# Patient Record
Sex: MALE | Race: WHITE | NOT HISPANIC OR LATINO | Employment: FULL TIME | URBAN - METROPOLITAN AREA
[De-identification: names, ages, dates, MRNs, and addresses within clinical notes are randomized per-mention and may not be internally consistent; named-entity substitution may affect disease eponyms.]

---

## 2018-03-03 ENCOUNTER — OFFICE VISIT - RIVER FALLS (OUTPATIENT)
Dept: FAMILY MEDICINE | Facility: CLINIC | Age: 58
End: 2018-03-03

## 2020-11-11 ENCOUNTER — OFFICE VISIT - RIVER FALLS (OUTPATIENT)
Dept: FAMILY MEDICINE | Facility: CLINIC | Age: 60
End: 2020-11-11

## 2020-11-11 ASSESSMENT — MIFFLIN-ST. JEOR: SCORE: 1879.94

## 2022-02-11 VITALS
TEMPERATURE: 98 F | HEART RATE: 64 BPM | OXYGEN SATURATION: 98 % | WEIGHT: 228.2 LBS | DIASTOLIC BLOOD PRESSURE: 82 MMHG | SYSTOLIC BLOOD PRESSURE: 144 MMHG

## 2022-02-11 VITALS
WEIGHT: 226.4 LBS | HEART RATE: 62 BPM | HEIGHT: 72 IN | OXYGEN SATURATION: 96 % | SYSTOLIC BLOOD PRESSURE: 134 MMHG | TEMPERATURE: 97.8 F | DIASTOLIC BLOOD PRESSURE: 74 MMHG | BODY MASS INDEX: 30.66 KG/M2

## 2022-02-16 NOTE — PROGRESS NOTES
Chief Complaint    c/o right great toenail inflamed.  has been soaking toe at night.  denies drainage from toe.  History of Present Illness       Patient is here with erythema and slight pain of his right great toe.  He is concerned about possible ingrown nail.  He does not recall an injury.  Last night the toe was fairly painful.  He has noticed discoloration of the toenail  Review of Systems       See HPI.  All other review of systems negative.  Physical Exam   Vitals & Measurements    T: 97.8  F (Tympanic)  HR: 62 (Peripheral)  BP: 134/74  SpO2: 96%     HT: 72 in  WT: 226.4 lb  BMI: 30.7        Alert, oriented, no acute distress       Normal heart rate       Nonlabored breathing       Exam of the right great toe reveals erythema of the paronychia him, fluctuance at the base of the nail, subungual fluid collection present  Assessment/Plan       Paronychia of great toe, right (L03.031)          Paronychia of the great toe with subungual fluid collection         The toenail was cleansed and an 18-gauge needle was used to drill a hole in the toenail.  There is return of thin purulent material.         I suspect he developed a small abscess under the toenail.  If symptoms do not improve over the next 48 hours he will start cephalexin 1000 mg twice daily for a week.  He will most likely lose his nail over the next couple of months.         Ordered:          cephalexin, = 2 tab(s) ( 1,000 mg ), PO, bid, x 5 day(s), # 20 tab(s), 0 Refill(s), Type: Acute, (Ordered)           Patient Information     Name:JOSE LEBRON      Address:      [address] [place] [id number]     Sex:Male     YOB: 1960     Phone:[phone]     Emergency Contact:[facility] EMERGENCY, CONTACT     MRN:[id number]     FIN:[id number]     Location:[facility]     Date of Service:11/11/2020      Primary Care Physician:       Fabian Casanova MD, [phone]      Attending Physician:       Juan Carbajal MD  [phone]  Problem List/Past Medical History    Ongoing     History of Sinusitis     Impingement Syndrome of Shoulder       Comments: Due to overuse.     Obesity    Historical     No qualifying data  Procedure/Surgical History     finger laceration (11/25/2009)      Comments: 5-0 nylon sutures placed..  Medications    cephalexin 500 mg oral tablet, 1000 mg= 2 tab(s), Oral, bid  Allergies    penicillins  Social History    Smoking Status     Never smoker     Alcohol - Current      Current, 1-2 times per month, 1 drinks/episode average.     Exercise - Regular exercise      Exercise frequency: 5-6 times/week.     Substance Abuse - Denies Substance Abuse     Tobacco - Denies Tobacco Use  Immunizations      Vaccine Date Status          influenza 10/11/2010 Given          Td 11/25/2009 Recorded

## 2022-02-16 NOTE — PROGRESS NOTES
Chief Complaint    Coughing up phlegm, sore throat, not feeling well. Ongoing x3-4 days.  History of Present Illness      Chief complaint as above reviewed and confirmed with patient.  Pt presents to the clinic with concerns re: congestion and cough.  sx present 3 days.  Has had thin nasal discharge but feels moderate PND and coughing up some thick sputum.  has some pressure but not pain in the face. no headache. no fevers.  no nausea, vomiting.  no fevers or chills.  activity and appetite are unchanged.  no hx sinus surgery.  no exposures.  Does have a hx of sinusitis .  coughing but no sob or difficulty breathing.  no chest pain.  no fevers.   Review of Systems      Review of systems is negative with the exception of those noted in HPI          Physical Exam   Vitals & Measurements    T: 98.0(Tympanic)  HR: 64(Peripheral)  BP: 144/82  SpO2: 98%     WT: 228.2 lb           Vitals as above per nursing documentation           Constitutional : nad appears well          Ears: ears patent B, TMS intact, noninjected           Nose: nasal mucosa is non-edematous. no discharge           Throat: pharynx is nonerythematous, no tonsillar hypertrophy, no exudate           Neck: neck supple, no adenopathy, no thyromegaly, no rigidity           Lungs: lungs CTA', no Wheezes, rhonchi or rales           Heart: heart RRR, nl S1, S2 no murmur           skin:  No rashes              Assessment/Plan       1. Acute URI         recommended conservative meausres, fluids ,rest and ibuprofen or tylenol for comfort. Tessalon for cough. seudafed for congetion.  PI given for sinusitis to hopefully use conservqative measures to avoid but if persisting greater th an 10-14 days or worsening will RTC.       Orders:         benzonatate, 2 cap(s) ( 200 mg ), PO, TID, # 60 cap(s), 0 Refill(s), Type: Maintenance, 2 cap(s) po tid,x10 day(s)         pseudoephedrine, 1 tab(s) ( 60 mg ), PO, q6hr, PRN: for cold symptoms, # 40 tab(s), 0 Refill(s), Type:  Maintenance, 1 tab(s) po q6 hrs,PRN:for cold symptoms  Patient Information     Name:JOSE LEBRON      Address:      [address] [place], [place] [id number]     Sex:Male     YOB: 1960     Phone:[phone]     MRN:[id number]     FIN:[id number]     Location:[facility]     Date of Service:03/03/2018      Primary Care Physician:       Fabian Casanova MD, [phone]  Problem List/Past Medical History    Ongoing     History of Sinusitis     Impingement Syndrome of Shoulder      Comments: Due to overuse.     Obesity    Historical  Procedure/Surgical History     finger laceration (11/25/2009)  Medications     Tessalon Perles 100 mg oral capsule: 200 mg, 2 cap(s), PO, TID, for 10 day(s), 60 cap(s), 0 Refill(s).     pseudoephedrine 60 mg oral tablet: 60 mg, 1 tab(s), PO, q6hr, PRN: for cold symptoms, 40 tab(s), 0 Refill(s).      Allergies    penicillins  Social History    Smoking Status - 03/03/2018     Never smoker     Alcohol - Current, 10/11/2010      Current, 1-2 times per month, 1 drinks/episode average.     Exercise and Physical Activity - Regular exercise, 10/11/2010      Exercise frequency: 5-6 times/week.     Substance Abuse - Denies Substance Abuse, 10/11/2010     Tobacco - Denies Tobacco Use, 10/11/2010  Immunizations      Vaccine Date Status      influenza 10/11/2010 Given      Td 11/25/2009 Recorded  Lab Results   Results (Last 90 days)   No results located.

## 2022-02-16 NOTE — NURSING NOTE
Comprehensive Intake Entered On:  11/11/2020 11:31 AM CST    Performed On:  11/11/2020 11:25 AM CST by Trena CEDEÑO, Loulou               Summary   Chief Complaint :   c/o right great toenail inflamed.  has been soaking toe at night.  denies drainage from toe.   Weight Measured :   226.4 lb(Converted to: 226 lb 6 oz, 102.693 kg)    Height Measured :   72 in(Converted to: 6 ft 0 in, 182.88 cm)    Body Mass Index :   30.7 kg/m2 (HI)    Body Surface Area :   2.28 m2   Systolic Blood Pressure :   134 mmHg (HI)    Diastolic Blood Pressure :   74 mmHg   Mean Arterial Pressure :   94 mmHg   Peripheral Pulse Rate :   62 bpm   BP Site :   Right arm   Pulse Site :   Radial artery   BP Method :   Manual   HR Method :   Manual   Temperature Tympanic :   97.8 DegF(Converted to: 36.6 DegC)  (LOW)    Oxygen Saturation :   96 %   Loulou Albrecht MA - 11/11/2020 11:25 AM CST   Health Status   Allergies Verified? :   Yes   Medication History Verified? :   Yes   Medical History Verified? :   Yes   Pre-Visit Planning Status :   Not completed   Tobacco Use? :   Never smoker   Loulou Albrecht MA - 11/11/2020 11:25 AM CST   Consents   Consent for Immunization Exchange :   Consent Granted   Consent for Immunizations to Providers :   Consent Granted   Loulou Albrecht MA - 11/11/2020 11:25 AM CST   Meds / Allergies   (As Of: 11/11/2020 11:31:05 AM CST)   Allergies (Active)   penicillins  Estimated Onset Date:   Unspecified ; Created By:   Nievse Little; Reaction Status:   Active ; Category:   Drug ; Substance:   penicillins ; Type:   Allergy ; Updated By:   Nieves Little; Reviewed Date:   3/30/2016 1:49 PM CDT        Medication List   (As Of: 11/11/2020 11:31:05 AM CST)   Prescription/Discharge Order    benzonatate  :   benzonatate ; Status:   Completed ; Ordered As Mnemonic:   Tessalon Perles 100 mg oral capsule ; Simple Display Line:   200 mg, 2 cap(s), PO, TID, for 10 day(s), 60 cap(s), 0 Refill(s) ; Ordering Provider:   Iván  Thu BAUGH; Catalog Code:   benzonatate ; Order Dt/Tm:   3/3/2018 3:29:38 PM CST          pseudoephedrine  :   pseudoephedrine ; Status:   Completed ; Ordered As Mnemonic:   pseudoephedrine 60 mg oral tablet ; Simple Display Line:   60 mg, 1 tab(s), PO, q6hr, PRN: for cold symptoms, 40 tab(s), 0 Refill(s) ; Ordering Provider:   Thu Minor PA-C; Catalog Code:   pseudoephedrine ; Order Dt/Tm:   3/3/2018 3:33:50 PM CST            ID Risk Screen   Recent Travel History :   No recent travel   Family Member Travel History :   No recent travel   Other Exposure to Infectious Disease :   Unknown   Loulou Albrecht MA - 11/11/2020 11:25 AM CST   Social History   Social History   (As Of: 11/11/2020 11:31:05 AM CST)   Alcohol:  Current      Current, 1-2 times per month, 1 drinks/episode average.   (Last Updated: 10/11/2010 11:27:33 AM CDT by Nieves Little CMA)          Tobacco:  Denies Tobacco Use      (Last Updated: 10/11/2010 7:49:08 AM CDT by Loulou Manley )         Substance Abuse:  Denies Substance Abuse      (Last Updated: 10/11/2010 11:27:36 AM CDT by Nieves Little CMA )         Exercise:  Regular exercise      Exercise frequency: 5-6 times/week.   (Last Updated: 10/11/2010 11:27:56 AM CDT by Nieves Little CMA)

## 2023-02-15 ENCOUNTER — ANCILLARY PROCEDURE (OUTPATIENT)
Dept: GENERAL RADIOLOGY | Facility: CLINIC | Age: 63
End: 2023-02-15
Attending: PHYSICIAN ASSISTANT
Payer: COMMERCIAL

## 2023-02-15 ENCOUNTER — OFFICE VISIT (OUTPATIENT)
Dept: FAMILY MEDICINE | Facility: CLINIC | Age: 63
End: 2023-02-15
Payer: COMMERCIAL

## 2023-02-15 VITALS
BODY MASS INDEX: 29.16 KG/M2 | DIASTOLIC BLOOD PRESSURE: 78 MMHG | RESPIRATION RATE: 18 BRPM | SYSTOLIC BLOOD PRESSURE: 112 MMHG | OXYGEN SATURATION: 96 % | WEIGHT: 220 LBS | TEMPERATURE: 97.5 F | HEART RATE: 74 BPM | HEIGHT: 73 IN

## 2023-02-15 DIAGNOSIS — M25.561 ACUTE PAIN OF RIGHT KNEE: Primary | ICD-10-CM

## 2023-02-15 DIAGNOSIS — M79.601 PAIN OF RIGHT UPPER EXTREMITY: ICD-10-CM

## 2023-02-15 DIAGNOSIS — M25.561 ACUTE PAIN OF RIGHT KNEE: ICD-10-CM

## 2023-02-15 PROBLEM — E66.9 OBESITY: Status: ACTIVE | Noted: 2023-02-15

## 2023-02-15 PROCEDURE — 73130 X-RAY EXAM OF HAND: CPT | Mod: TC | Performed by: RADIOLOGY

## 2023-02-15 PROCEDURE — 99213 OFFICE O/P EST LOW 20 MIN: CPT | Performed by: PHYSICIAN ASSISTANT

## 2023-02-15 PROCEDURE — 73562 X-RAY EXAM OF KNEE 3: CPT | Mod: TC | Performed by: RADIOLOGY

## 2023-02-15 RX ORDER — PREDNISONE 20 MG/1
20 TABLET ORAL DAILY
Qty: 7 TABLET | Refills: 0 | Status: SHIPPED | OUTPATIENT
Start: 2023-02-15 | End: 2023-06-11

## 2023-02-15 ASSESSMENT — ENCOUNTER SYMPTOMS
NUMBNESS: 0
ARTHRALGIAS: 0
PARESTHESIAS: 0
ACTIVITY CHANGE: 1

## 2023-02-15 NOTE — PROGRESS NOTES
"  Assessment & Plan     Acute pain of right knee  Treat as bursitis trial of prednisone May try ice alternating with heat if not improved in 10 days he can call and we can get into Ortho  - XR Knee Right 3 Views  - predniSONE (DELTASONE) 20 MG tablet  Dispense: 7 tablet; Refill: 0    Pain of right upper extremity  See above  - XR Hand Right G/E 3 Views  - predniSONE (DELTASONE) 20 MG tablet  Dispense: 7 tablet; Refill: 0               BMI:   Estimated body mass index is 29.03 kg/m  as calculated from the following:    Height as of this encounter: 1.854 m (6' 1\").    Weight as of this encounter: 99.8 kg (220 lb).           No follow-ups on file.    OLIVIA Urias  [facility]    Juan Jose Reynoso is a 62 year old, presenting for the following health issues:  Pain (Right Leg and Right Hand)      62-year-old male presents to clinic with 2 orthopedic concerns #1 he was popping the first MCP on his right hand and he felt sharp pain since that time its been swollen and uncomfortable  He states he cracks his knuckles frequently and has not experienced similar problem #2 is had some medial right knee pain no injury he does hike [place] is to have pressure on it at nighttime so he puts a pillow between his knees he does do some kneeling at work and some squatting he has not noted a lack clicks or any give way sensation of some soft tissue swelling superior and lateral to the patella that seems to be better       Description: no known leg injury, but that has been bothering him for about a month. Mild but limited ROM.   The right hand has been hurting bout a week.  He was popping the joint and its hurt ever since. Moderate pain level.        Review of Systems   Constitutional: Positive for activity change.   Musculoskeletal: Positive for gait problem. Negative for arthralgias.   Skin: Negative.    Neurological: Negative for numbness and paresthesias.            Objective  " "  /78   Pulse 74   Temp 97.5  F (36.4  C)   Resp 18   Ht 1.854 m (6' 1\")   Wt 99.8 kg (220 lb)   SpO2 96%   BMI 29.03 kg/m    Body mass index is 29.03 kg/m .  Physical Exam swelling at the first MCP and pain over the dorsum of the proximal phalanx of the thumb good range of motion normal strength intact radial pulse no snuffbox tenderness no other tenderness about the hand wrist or phalanges right knee he has tenderness medially just inferior to the knee joint negative Mae's no laxity no soft tissue swelling which is mobile superior and lateral to the patella no significant patellar bursa swelling no erythema posterior tibial pulse intact normal range of motion of the knee    Results for orders placed or performed in visit on 02/15/23   XR Knee Right 3 Views     Status: None    Narrative    EXAM: XR KNEE RIGHT 3 VIEWS  LOCATION: [facility]  DATE/TIME: 2/15/2023 4:42 PM    INDICATION:  Acute pain of right knee  COMPARISON: None.      Impression    IMPRESSION: Prepatellar soft tissue swelling. No fractures are evident. No knee joint effusion. Mild hypertrophic change in the medial and patellofemoral compartments. Normal patellar alignment.   Results for orders placed or performed in visit on 02/15/23   XR Hand Right G/E 3 Views     Status: None    Narrative    EXAM: XR HAND RIGHT G/E 3 VIEWS  LOCATION: [facility]  DATE/TIME: 2/15/2023 4:37 PM    INDICATION:  Pain of right upper extremity  COMPARISON: None.      Impression    IMPRESSION: Mild degenerative changes in the first CMC joint, first, third and fourth MCP joints and multiple IP joints. No fractures are evident.                     "

## 2023-06-07 ENCOUNTER — OFFICE VISIT (OUTPATIENT)
Dept: FAMILY MEDICINE | Facility: CLINIC | Age: 63
End: 2023-06-07
Payer: COMMERCIAL

## 2023-06-07 VITALS
HEIGHT: 73 IN | TEMPERATURE: 98.3 F | WEIGHT: 226.2 LBS | HEART RATE: 61 BPM | OXYGEN SATURATION: 99 % | DIASTOLIC BLOOD PRESSURE: 72 MMHG | BODY MASS INDEX: 29.98 KG/M2 | RESPIRATION RATE: 18 BRPM | SYSTOLIC BLOOD PRESSURE: 120 MMHG

## 2023-06-07 DIAGNOSIS — Z80.0 FAMILY HISTORY OF MALIGNANT NEOPLASM OF GASTROINTESTINAL TRACT: ICD-10-CM

## 2023-06-07 DIAGNOSIS — M79.89 LEFT LEG SWELLING: Primary | ICD-10-CM

## 2023-06-07 DIAGNOSIS — M71.22 POPLITEAL CYST, LEFT: ICD-10-CM

## 2023-06-07 DIAGNOSIS — R60.9 EDEMA, UNSPECIFIED TYPE: ICD-10-CM

## 2023-06-07 PROCEDURE — 99214 OFFICE O/P EST MOD 30 MIN: CPT | Performed by: FAMILY MEDICINE

## 2023-06-07 NOTE — PROGRESS NOTES
"j  Assessment & Plan   Problem List Items Addressed This Visit    None  Visit Diagnoses     Left leg swelling    -  Primary    Family history of malignant neoplasm of gastrointestinal tract        Relevant Orders    Primary Care - Care Coordination Referral    Edema, unspecified type        Popliteal cyst, left          .  Patient presents to clinic with left lower extremity swelling.  No recent trips.  No known malignancy however he does report that his dad is  from what he thinks was a primary stomach malignancy.  However, he is not sure.  Dad's care was anteroposteriorly.  We will place care coordinator referral their is aware that patient be able to find out what type of cancers.  This would help us place a referral for genetic counseling.  Patient has no pain in his leg.  No recent injury.  Discussed with patient and concern there might be a DVT.  He does have DVT then pulmonary was also positive.  Warm handoff provided to [facility] emergency room physician.  Patient brought in for evaluation and treatment to rule out possible DVT.  Explained him that if needed they were started on blood thinners.  If symptoms continue and emergency room visit did not reveal cause back to return to clinic for further evaluation and treatment.    Addendum  chart reviewed ER visit revealed no evidence of DVT.  Nuvigil popliteal cyst was found on the left.             BMI:   Estimated body mass index is 29.84 kg/m  as calculated from the following:    Height as of this encounter: 1.854 m (6' 1\").    Weight as of this encounter: 102.6 kg (226 lb 3.2 oz).           Beth Cervantes MD  [facility]    Juan Jose Reynoso is a 62 year old, presenting for the following health issues:  Knee Pain (Left knee pain and swelling for the past 1-2 weeks denies any injury ) and Leg Swelling (Left leg for the past 1-2 weeks denies any injury  )        2023     4:04 PM   Additional " "Questions   Roomed by JANET Weiss     Knee Pain    History of Present Illness       Reason for visit:  Leg swelling little pain  Symptom onset:  1-2 weeks ago  Symptoms include:  Swelling  Symptom intensity:  Moderate  Symptom progression:  Staying the same  Had these symptoms before:  No    He eats 2-3 servings of fruits and vegetables daily.He consumes 2 sweetened beverage(s) daily.He exercises with enough effort to increase his heart rate 60 or more minutes per day.  He exercises with enough effort to increase his heart rate 5 days per week.   He is taking medications regularly.               Review of Systems         Objective    /72 (BP Location: Right arm, Patient Position: Sitting, Cuff Size: Adult Regular)   Pulse 61   Temp 98.3  F (36.8  C) (Tympanic)   Resp 18   Ht 1.854 m (6' 1\")   Wt 102.6 kg (226 lb 3.2 oz)   SpO2 99%   BMI 29.84 kg/m    Body mass index is 29.84 kg/m .  Physical Exam                       "

## 2023-06-09 ENCOUNTER — PATIENT OUTREACH (OUTPATIENT)
Dept: CARE COORDINATION | Facility: CLINIC | Age: 63
End: 2023-06-09
Payer: COMMERCIAL

## 2023-06-09 NOTE — PROGRESS NOTES
Clinic Care Coordination Contact  [facility]       Clinical Data: Care Coordinator Outreach  Outreach attempted x 1. No answer at time of CHW call today.    Plan: Care Coordinator CHW to discuss recent CC referral, provide resources  Care Coordinator will try to reach patient again in 1-2 business days= 2023    CHW to provide patient with phone number for [facility] medical Records [phone].  He is looking for the medical records for his father.  He may need to have a death certificate.  Do Not Schedule an Assessment.  Resources Only  Comments    Dad was seen by Anna and had a gastric cancer, dad is now , wondering how to get that info            Order Questions    Question Answer   Reason for Referral: Other   My Clinical Question Is: pateint needs to know the type of cancer his dad had,   Clinical Staff have discussed the Care Coordination Referral with the patient and/or caregiver: Yes           Roz MAYORGA  Community Health Worker  [facility] Care Coordination  [facility] [place] [email or web address]  [email or web address]  Office: [phone]

## 2023-06-11 PROBLEM — M71.22 POPLITEAL CYST, LEFT: Status: ACTIVE | Noted: 2023-06-11

## 2023-06-12 ENCOUNTER — PATIENT OUTREACH (OUTPATIENT)
Dept: CARE COORDINATION | Facility: CLINIC | Age: 63
End: 2023-06-12
Payer: COMMERCIAL

## 2023-06-12 NOTE — LETTER
[facility] CARE COORDINATION  [address] [address]    June 12, 2023    Edgardo David  [address] [address]      Dear Edgardo,    I am a  clinic community health worker who works with Fabian Casanova MD with the [facility]. I have been trying to reach you recently to introduce Clinic Care Coordination. Below is a description of clinic care coordination and how I can further assist you.       The clinic care coordination team is made up of a registered nurse, , financial resource worker and community health worker who understand the health care system. The goal of clinic care coordination is to help you manage your health and improve access to the health care system. Our team works alongside your provider to assist you in determining your health and social needs. We can help you obtain health care and community resources, providing you with necessary information and education. We can work with you through any barriers and develop a care plan that helps coordinate and strengthen the communication between you and your care team.  Our services are voluntary and are offered without charge to you personally.    Please feel free to contact me with any questions or concerns regarding care coordination and what we can offer.      We are focused on providing you with the highest-quality healthcare experience possible.    Sincerely,     Lucila Jacome  Community Health Worker  [facility]  Clinic Care Coordination   Office: [phone]

## 2023-06-12 NOTE — PROGRESS NOTES
Clinic Care Coordination Contact  [facility]/Voicemail    Clinical Data: Care Coordinator Outreach  Outreach attempted x 2.  Left message on patient's voicemail with call back information and requested return call.    Plan: Care Coordinator will send care coordination introduction letter with care coordinator contact information and explanation of care coordination services via mail. Care Coordinator will do no further outreaches at this time.    Lucila [facility] Health Worker  [facility] Care Coordination   [place], [place], [place], [facility]  Office: [phone]

## 2023-06-12 NOTE — LETTER
[facility] CARE COORDINATION  [address] [address]    June 12, 2023    Edgardo David  [address] [address]      Dear Edgardo,    I am a  clinic community health worker who works with Fabian Casanova MD with the [facility]. I wanted to thank you for spending the time to talk with me.  Below is a description of clinic care coordination and how I can further assist you.       The clinic care coordination team is made up of a registered nurse, , financial resource worker and community health worker who understand the health care system. The goal of clinic care coordination is to help you manage your health and improve access to the health care system. Our team works alongside your provider to assist you in determining your health and social needs. We can help you obtain health care and community resources, providing you with necessary information and education. We can work with you through any barriers and develop a care plan that helps coordinate and strengthen the communication between you and your care team.  Our services are voluntary and are offered without charge to you personally.    Please feel free to contact me with any questions or concerns regarding care coordination and what we can offer.      We are focused on providing you with the highest-quality healthcare experience possible.        Sincerely,     Lucila Jacome  Community Health Worker  [facility]  Clinic Care Coordination   Office: [phone]

## 2023-06-12 NOTE — PROGRESS NOTES
Clinic Care Coordination Contact  Community Health Worker Initial Outreach    Patient accepts CC: No, CHW gave patient the phone number to [facility] Medical records. No other needs at this time. Patient will be sent Care Coordination introduction letter for future reference.     Lucila Jacome  [facility] Health Worker  [facility]  Clinic Care Coordination   [place], [facility], [facility]  Office: [phone]

## 2024-07-08 ENCOUNTER — OFFICE VISIT (OUTPATIENT)
Dept: FAMILY MEDICINE | Facility: CLINIC | Age: 64
End: 2024-07-08
Payer: COMMERCIAL

## 2024-07-08 VITALS
OXYGEN SATURATION: 98 % | HEIGHT: 73 IN | TEMPERATURE: 99 F | WEIGHT: 227 LBS | HEART RATE: 72 BPM | SYSTOLIC BLOOD PRESSURE: 152 MMHG | BODY MASS INDEX: 30.09 KG/M2 | DIASTOLIC BLOOD PRESSURE: 70 MMHG | RESPIRATION RATE: 20 BRPM

## 2024-07-08 DIAGNOSIS — M71.22 BAKER'S CYST OF KNEE, LEFT: ICD-10-CM

## 2024-07-08 DIAGNOSIS — R22.42 LOCALIZED SWELLING OF LEFT LOWER LEG: Primary | ICD-10-CM

## 2024-07-08 PROCEDURE — 99213 OFFICE O/P EST LOW 20 MIN: CPT | Performed by: PHYSICIAN ASSISTANT

## 2024-07-08 NOTE — PROGRESS NOTES
Pt STAT US order was faxed to the [facility] for scheduling.  Pt to arrive at [facility] at 530 for 545 STAT US per [facility] staff.  [phone]  Jamir Sifuentes CMA

## 2024-07-08 NOTE — PROGRESS NOTES
"US reveals large dissecting Baker's cyst. No DVT. Will get ortho consult. Patient called with his results and the plan.  Assessment & Plan     (R22.42) Localized swelling of left lower leg  (primary encounter diagnosis)  Comment: Worsening  Plan: US Lower Extremity Venous Duplex Left        Will get stat venous If Doppler positive will start on DOAC's if negative he will elevate and uses compression he will follow-up immediately if he develops chest pain or shortness of breath          BMI  Estimated body mass index is 29.95 kg/m  as calculated from the following:    Height as of this encounter: 1.854 m (6' 1\").    Weight as of this encounter: 103 kg (227 lb).             Juan Jose Reynoso is a 64 year old, presenting for the following health issues:  Knee Pain (Pt c/o left knee pain,edema and redness radiating into left leg x 10-12 days)      7/8/2024     4:29 PM   Additional Questions   Roomed by Jamir GONZALES     64-year-old presents to the clinic with complaint of swelling of the left lower extremity for about the past 10 days  There is been no injury  He noticed it after playing a round of golf  He said no chest pain or shortness of breath  He does not recall a bite  About 1 year ago he had a similar episode he had a venous Doppler ruled out DVT did show a popliteal cyst  He has not had problems since  He does wear compression at times he did not wear it today it is more swollen after being on it at work all day  Feels tight just inferior to the popliteal fossa    History of Present Illness       Reason for visit:  Left leg swelling  Symptom onset:  1-2 weeks ago  Symptoms include:  Small pain in knee  Symptom intensity:  Mild  Symptom progression:  Improving  Had these symptoms before:  Yes  Has tried/received treatment for these symptoms:  No  What makes it worse:  No  What makes it better:  No    He eats 0-1 servings of fruits and vegetables daily.He consumes 1 sweetened beverage(s) daily.He exercises with " "enough effort to increase his heart rate 60 or more minutes per day.  He exercises with enough effort to increase his heart rate 5 days per week.   He is taking medications regularly.                     Objective    BP (!) 152/78 (BP Location: Right arm, Patient Position: Sitting, Cuff Size: Adult Large)   Pulse 72   Temp 99  F (37.2  C) (Tympanic)   Resp 20   Ht 1.854 m (6' 1\")   Wt 103 kg (227 lb)   SpO2 98%   BMI 29.95 kg/m    Body mass index is 29.95 kg/m .  Physical Exam alert attentive no acute distress  Cardiovascular regular rate and rhythms respirations are unlabored  He has some swelling over the left lower extremity he has a little tightness in the popliteal fossa and just inferior to that he has no calf tightness  Homans' sign is negative  There is no significant erythema  The skin is intact there is no drainage  He has a intact strong posterior tibial pulse              Signed Electronically by: OLIVIA Urias    "

## 2024-10-05 ENCOUNTER — HEALTH MAINTENANCE LETTER (OUTPATIENT)
Age: 64
End: 2024-10-05

## 2025-04-21 ENCOUNTER — ALLIED HEALTH/NURSE VISIT (OUTPATIENT)
Dept: EDUCATION SERVICES | Facility: CLINIC | Age: 65
End: 2025-04-21
Payer: COMMERCIAL

## 2025-04-21 VITALS — HEIGHT: 73 IN | WEIGHT: 220.8 LBS | BODY MASS INDEX: 29.26 KG/M2

## 2025-04-21 DIAGNOSIS — E11.9 TYPE 2 DIABETES MELLITUS WITHOUT COMPLICATION, WITHOUT LONG-TERM CURRENT USE OF INSULIN (H): Primary | ICD-10-CM

## 2025-04-21 PROCEDURE — G0108 DIAB MANAGE TRN  PER INDIV: HCPCS | Performed by: DIETITIAN, REGISTERED

## 2025-04-21 RX ORDER — LANCETS
EACH MISCELLANEOUS
Qty: 100 EACH | Refills: 6 | Status: SHIPPED | OUTPATIENT
Start: 2025-04-21

## 2025-04-21 NOTE — PATIENT INSTRUCTIONS
Two Good Yogurt - lower carb option         Goals:  Practice healthy stress management and mindful eating - think are you physically hungry or are you bored, stressed, emotional etc, make of list of things to do besides eat.    Try to get good quality sleep with a goal of 7-8 hours per night.  Stay physically active daily.  Recommend working up to a total of 30 minutes on 5 days/ week.  Recommend a fitness tracker.     Eat in a healthy way- eliminate trans fats, limit saturated fats and added sugars; follow the plate method - picture above.  Keep a food record (MyFitnessPal, Loseit).    A meal is 3 or more food groups; make it colorful for better nutrition.    Total Carbohydrates (in grams) = Breakfast  30    Lunch  30    Supper  30    If desired snacks 15                Blood Glucose testing max at any point 180 mg/dL  Pick 1 meal to test    (before and 2 hours after one meal)  Before eating goal 80 - 130mg/dL  2 hours after goal 80 - 150mg/dL

## 2025-04-21 NOTE — PROGRESS NOTES
Diabetes Self-Management Education & Support    Presents for: Initial Assessment for new diagnosis type 2 diabetes, overweight Body mass index is 29.13 kg/m .    Type of Service: In Person Visit      Assessment  4/21/2025 Initial education for new diagnosis of type 2 diabetes.  Since patient found out new diagnosis has already started implementing significant dietary changes and is very willing to continue with dietary and lifestyle modifications to improve glycemic control.  Patient will start blood glucose testing and follow-up in 6 weeks.  Patient has started taking 1 tablet 500 mg metformin XR and has no concerns.      Patient's most recent   Lab Results   Component Value Date    A1C 8.5 04/11/2025     is not meeting goal of <7.0    Diabetes knowledge and skills assessment:   Patient is knowledgeable in diabetes management concepts related to: Education started today    Based on learning assessment above, most appropriate setting for further diabetes education would be: Individual setting.    Care Plan and Education Provided:  Healthy Eating: Carbohydrate Counting, Heart healthy diet, Label reading, Plate planning method, Portion control, and Weight Management,     Being Active: Amount recommended (150 minutes moderate or 75 minutes vigorous activity and 2-3 days strength training per week) and Relationship of activity to glucose,     Monitoring: Frequency of monitoring, Individual glucose targets, and Log and interpret results,     Taking Medication: Action of prescribed medication(s),     Problem Solving: High glucose - causes, signs/symptoms, treatment and prevention,     Reducing Risks: Goal for A1c, how it relates to glucose and how often to check, and     Healthy Coping: Benefits of making appropriate lifestyle changes and Identifying helpful resources    Patient verbalized understanding of diabetes self-management education concepts discussed, opportunities for ongoing education and support, and  "recommendations provided today.    Plan    Total Carbohydrates (in grams) = Breakfast  30    Lunch  30    Supper  30    If desired snacks 15                Blood Glucose testing max at any point 180 mg/dL  Pick 1 meal to test    (before and 2 hours after one meal)  Before eating goal 80 - 130mg/dL  2 hours after goal 80 - 150mg/dL     Topics to cover at upcoming visits: Healthy Eating, Being Active, Monitoring, Taking Medication, Problem Solving, Reducing Risks, and Healthy Coping    Follow-up:  Upcoming Diabetes Ed Appointments     Visit Type Date Time Department    DIABETES ED 4/21/2025  2:00 PM [facility] [place] DIABETES EDUCATION    DIABETES ED 6/5/2025  2:00 PM [facility] [place] DIABETES EDUCATION        See Care Plan for co-developed, patient-state behavior change goals.    Education Materials Provided:      Subjective/Objective  Prem is an 64 year old, presenting for the following diabetes education related to: Initial Assessment for new diagnosis  Cultural Influences/Ethnic Background:  Not  or     Diabetes Symptoms & Complications:  Diabetes Related Symptoms: None  Weight trend: (Patient-Rptd) Stable  Symptom course: (Patient-Rptd) Stable  Disease course: (Patient-Rptd) Stable  Complications assessed today?: Yes  Autonomic neuropathy: No  CVA: No  Heart disease: No  Nephropathy: No  Peripheral neuropathy: No  Peripheral Vascular Disease: No  Retinopathy: No  Sexual dysfunction: No    Patient Problem List and Family Medical History reviewed for relevant medical history, current medical status, and diabetes risk factors.    Vitals:  Ht 1.854 m (6' 1\")   Wt 100.2 kg (220 lb 12.8 oz)   BMI 29.13 kg/m    Estimated body mass index is 29.13 kg/m  as calculated from the following:    Height as of this encounter: 1.854 m (6' 1\").    Weight as of this encounter: 100.2 kg (220 lb 12.8 oz).   Last 3 BP:   BP Readings from Last 3 Encounters:   04/11/25 136/80   07/08/24 (!) 152/70   06/07/23 120/72       History   Smoking Status " "   Never   Smokeless Tobacco    Never       Labs:  Lab Results   Component Value Date    A1C 8.5 04/11/2025     Lab Results   Component Value Date     04/11/2025     Lab Results   Component Value Date     04/11/2025     Direct Measure HDL   Date Value Ref Range Status   04/11/2025 28 (L) >=40 mg/dL Final   ]  GFR Estimate   Date Value Ref Range Status   04/11/2025 68 >60 mL/min/1.73m2 Final     Comment:     eGFR calculated using 2021 CKD-EPI equation.     No results found for: \"GFRESTBLACK\"  Lab Results   Component Value Date    CR 1.20 04/11/2025     Lab Results   Component Value Date    MICROL 15.3 04/11/2025    UMALCR 10.07 04/11/2025    UCRR 152.0 04/11/2025 4/21/2025   Healthy Eating   Healthy Eating Assessed Today Yes   Cultural/Jewish diet restrictions? No   Do you have any food allergies or intolerances? No   Meal planning/habits --   Who cooks/prepares meals for you? Self;Spouse   Who purchases food in  your home? Self;Spouse   How many times a week on average do you eat food made away from home (restaurant/take-out)? 1   Meals include Dinner;Morning Snack;Afternoon Snack   Breakfast 2:30 am work water, banana, coffee, granola bar;  during work PB and jelly 1 sl bread, 1 hr later yogurt   Lunch on way home 1 sl PB and Jelly, granola bar; snack later almonds, crasins, ocean spray diet cachorro;   Dinner factor meal, diet green tea   Snacks now on 45 bread, SF jelly (less)   Other previously was chocolate granola bar, regular juice was regular green tea, was more sweets   Beverages Water;Tea;Coffee;Milk   Has patient met with a dietitian in the past? No         4/21/2025   Being Active   Being Active Assessed Today Yes   Exercise: Yes   Days per week of moderate to strenuous exercise (like a brisk walk) 7   On average, minutes per day of exercise at this level 40   How intense was your typical exercise?  Moderate (like brisk walking)   Exercise Minutes per Week 280   Barrier to exercise " None         4/21/2025   Monitoring   Monitoring Assessed Today --     Diabetes Medication(s)       Biguanides       metFORMIN (GLUCOPHAGE XR) 500 MG 24 hr tablet Take 1 tablet (500 mg) by mouth daily (with dinner).              4/21/2025   Taking Medications   Taking Medication Assessed Today Yes   Current Treatments Oral Medication (taken by mouth);Diet   Problems taking diabetes medications regularly? No   Diabetes medication side effects? No         4/21/2025   Problem Solving   Problem Solving Assessed Today Yes   Is the patient at risk for hypoglycemia? No   Is the patient at risk for DKA? No   Does patient have severe weather/disaster plan for diabetes management? Not Needed   Does patient have sick day plan for diabetes management? Not Needed           4/21/2025   Reducing Risks   Reducing Risks Assessed Today Yes   Diabetes Risks Age over 45 years   CAD Risks Male sex   Has dilated eye exam at least once a year? No   Sees dentist every 6 months? Yes   Feet checked by healthcare provider in the last year? No         4/21/2025   Healthy Coping: Diabetes Distress Assessment   Healthy Coping Assessed Today Yes   I feel burned out by all of the attention and effort that diabetes demands of me. 1 - Not a Problem   It bothers me that diabetes seems to control my life. 1 - Not a Problem   I am frustrated that even when I do what I am supposed to for my diabetes, it doesn't seem to make a difference. 1 - Not a Problem   No matter how hard I try with my diabetes, it feels like it will never be good enough. 1 - Not a Problem   I am so tired of having to worry about diabetes all the time. 1 - Not a Problem   When it comes to my diabetes, I often feel like a failure. 1 - Not a Problem   It depresses me when I realize that my diabetes will likely never go away. 1 - Not a Problem   Living with diabetes is overwhelming for me. 1 - Not a Problem   T2 DDAS Total Score (0 - 1.9 Little or no DD, 2.0 - 2.9 Moderate DD,  3.0+  High DD) 1    Informal Support system: Family;Spouse       Patient-reported       CARROL Chandler  Time Spent: 60 minutes  Encounter Type: Individual    Any diabetes medication dose changes were made via the [facility] Standing Orders under the patient's referring provider.

## 2025-04-21 NOTE — LETTER
4/21/2025         RE: Edgardo David  [address] [id number]        Dear Colleague,    Thank you for referring your patient, Edgardo David, to the [facility]. Please see a copy of my visit note below.    Diabetes Self-Management Education & Support    Presents for: Initial Assessment for new diagnosis type 2 diabetes, overweight Body mass index is 29.13 kg/m .    Type of Service: In Person Visit      Assessment  4/21/2025 Initial education for new diagnosis of type 2 diabetes.  Since patient found out new diagnosis has already started implementing significant dietary changes and is very willing to continue with dietary and lifestyle modifications to improve glycemic control.  Patient will start blood glucose testing and follow-up in 6 weeks.  Patient has started taking 1 tablet 500 mg metformin XR and has no concerns.      Patient's most recent   Lab Results   Component Value Date    A1C 8.5 04/11/2025     is not meeting goal of <7.0    Diabetes knowledge and skills assessment:   Patient is knowledgeable in diabetes management concepts related to: Education started today    Based on learning assessment above, most appropriate setting for further diabetes education would be: Individual setting.    Care Plan and Education Provided:  Healthy Eating: Carbohydrate Counting, Heart healthy diet, Label reading, Plate planning method, Portion control, and Weight Management,     Being Active: Amount recommended (150 minutes moderate or 75 minutes vigorous activity and 2-3 days strength training per week) and Relationship of activity to glucose,     Monitoring: Frequency of monitoring, Individual glucose targets, and Log and interpret results,     Taking Medication: Action of prescribed medication(s),     Problem Solving: High glucose - causes, signs/symptoms, treatment and prevention,     Reducing Risks: Goal for A1c, how it relates to glucose and how often to check, and  "    Healthy Coping: Benefits of making appropriate lifestyle changes and Identifying helpful resources    Patient verbalized understanding of diabetes self-management education concepts discussed, opportunities for ongoing education and support, and recommendations provided today.    Plan    Total Carbohydrates (in grams) = Breakfast  30    Lunch  30    Supper  30    If desired snacks 15                Blood Glucose testing max at any point 180 mg/dL  Pick 1 meal to test    (before and 2 hours after one meal)  Before eating goal 80 - 130mg/dL  2 hours after goal 80 - 150mg/dL     Topics to cover at upcoming visits: Healthy Eating, Being Active, Monitoring, Taking Medication, Problem Solving, Reducing Risks, and Healthy Coping    Follow-up:  Upcoming Diabetes Ed Appointments     Visit Type Date Time Department    DIABETES ED 4/21/2025  2:00 PM [facility] DIABETES EDUCATION    DIABETES ED 6/5/2025  2:00 PM [facility] DIABETES EDUCATION        See Care Plan for co-developed, patient-state behavior change goals.    Education Materials Provided:      Subjective/Objective  Prem is an 64 year old, presenting for the following diabetes education related to: Initial Assessment for new diagnosis  Cultural Influences/Ethnic Background:  Not  or     Diabetes Symptoms & Complications:  Diabetes Related Symptoms: None  Weight trend: (Patient-Rptd) Stable  Symptom course: (Patient-Rptd) Stable  Disease course: (Patient-Rptd) Stable  Complications assessed today?: Yes  Autonomic neuropathy: No  CVA: No  Heart disease: No  Nephropathy: No  Peripheral neuropathy: No  Peripheral Vascular Disease: No  Retinopathy: No  Sexual dysfunction: No    Patient Problem List and Family Medical History reviewed for relevant medical history, current medical status, and diabetes risk factors.    Vitals:  Ht 1.854 m (6' 1\")   Wt 100.2 kg (220 lb 12.8 oz)   BMI 29.13 kg/m    Estimated body mass index is 29.13 kg/m  as calculated from the " "following:    Height as of this encounter: 1.854 m (6' 1\").    Weight as of this encounter: 100.2 kg (220 lb 12.8 oz).   Last 3 BP:   BP Readings from Last 3 Encounters:   04/11/25 136/80   07/08/24 (!) 152/70   06/07/23 120/72       History   Smoking Status     Never   Smokeless Tobacco     Never       Labs:  Lab Results   Component Value Date    A1C 8.5 04/11/2025     Lab Results   Component Value Date     04/11/2025     Lab Results   Component Value Date     04/11/2025     Direct Measure HDL   Date Value Ref Range Status   04/11/2025 28 (L) >=40 mg/dL Final   ]  GFR Estimate   Date Value Ref Range Status   04/11/2025 68 >60 mL/min/1.73m2 Final     Comment:     eGFR calculated using 2021 CKD-EPI equation.     No results found for: \"GFRESTBLACK\"  Lab Results   Component Value Date    CR 1.20 04/11/2025     Lab Results   Component Value Date    MICROL 15.3 04/11/2025    UMALCR 10.07 04/11/2025    UCRR 152.0 04/11/2025 4/21/2025   Healthy Eating   Healthy Eating Assessed Today Yes   Cultural/Sabianism diet restrictions? No   Do you have any food allergies or intolerances? No   Meal planning/habits --   Who cooks/prepares meals for you? Self;Spouse   Who purchases food in  your home? Self;Spouse   How many times a week on average do you eat food made away from home (restaurant/take-out)? 1   Meals include Dinner;Morning Snack;Afternoon Snack   Breakfast 2:30 am work water, banana, coffee, granola bar;  during work PB and jelly 1 sl bread, 1 hr later yogurt   Lunch on way home 1 sl PB and Jelly, granola bar; snack later almonds, crasins, ocean spray diet cachorro;   Dinner factor meal, diet green tea   Snacks now on 45 bread, SF jelly (less)   Other previously was chocolate granola bar, regular juice was regular green tea, was more sweets   Beverages Water;Tea;Coffee;Milk   Has patient met with a dietitian in the past? No         4/21/2025   Being Active   Being Active Assessed Today Yes   Exercise: " Yes   Days per week of moderate to strenuous exercise (like a brisk walk) 7   On average, minutes per day of exercise at this level 40   How intense was your typical exercise?  Moderate (like brisk walking)   Exercise Minutes per Week 280   Barrier to exercise None         4/21/2025   Monitoring   Monitoring Assessed Today --     Diabetes Medication(s)       Biguanides       metFORMIN (GLUCOPHAGE XR) 500 MG 24 hr tablet Take 1 tablet (500 mg) by mouth daily (with dinner).              4/21/2025   Taking Medications   Taking Medication Assessed Today Yes   Current Treatments Oral Medication (taken by mouth);Diet   Problems taking diabetes medications regularly? No   Diabetes medication side effects? No         4/21/2025   Problem Solving   Problem Solving Assessed Today Yes   Is the patient at risk for hypoglycemia? No   Is the patient at risk for DKA? No   Does patient have severe weather/disaster plan for diabetes management? Not Needed   Does patient have sick day plan for diabetes management? Not Needed           4/21/2025   Reducing Risks   Reducing Risks Assessed Today Yes   Diabetes Risks Age over 45 years   CAD Risks Male sex   Has dilated eye exam at least once a year? No   Sees dentist every 6 months? Yes   Feet checked by healthcare provider in the last year? No         4/21/2025   Healthy Coping: Diabetes Distress Assessment   Healthy Coping Assessed Today Yes   I feel burned out by all of the attention and effort that diabetes demands of me. 1 - Not a Problem   It bothers me that diabetes seems to control my life. 1 - Not a Problem   I am frustrated that even when I do what I am supposed to for my diabetes, it doesn't seem to make a difference. 1 - Not a Problem   No matter how hard I try with my diabetes, it feels like it will never be good enough. 1 - Not a Problem   I am so tired of having to worry about diabetes all the time. 1 - Not a Problem   When it comes to my diabetes, I often feel like a  failure. 1 - Not a Problem   It depresses me when I realize that my diabetes will likely never go away. 1 - Not a Problem   Living with diabetes is overwhelming for me. 1 - Not a Problem   T2 DDAS Total Score (0 - 1.9 Little or no DD, 2.0 - 2.9 Moderate DD,  3.0+ High DD) 1    Informal Support system: Family;Spouse       Patient-reported       Bouchra Plummer RD [facility]  Time Spent: 60 minutes  Encounter Type: Individual    Any diabetes medication dose changes were made via the [facility] Standing Orders under the patient's referring provider.

## 2025-04-25 ENCOUNTER — ORDERS ONLY (AUTO-RELEASED) (OUTPATIENT)
Dept: FAMILY MEDICINE | Facility: CLINIC | Age: 65
End: 2025-04-25
Payer: COMMERCIAL

## 2025-04-25 DIAGNOSIS — Z12.11 SCREEN FOR COLON CANCER: ICD-10-CM

## 2025-06-05 ENCOUNTER — ALLIED HEALTH/NURSE VISIT (OUTPATIENT)
Dept: EDUCATION SERVICES | Facility: CLINIC | Age: 65
End: 2025-06-05
Payer: COMMERCIAL

## 2025-06-05 VITALS — HEIGHT: 73 IN | WEIGHT: 207.9 LBS | BODY MASS INDEX: 27.55 KG/M2

## 2025-06-05 DIAGNOSIS — E11.9 TYPE 2 DIABETES MELLITUS WITHOUT COMPLICATION, WITHOUT LONG-TERM CURRENT USE OF INSULIN (H): Primary | ICD-10-CM

## 2025-06-05 NOTE — PROGRESS NOTES
Diabetes Self-Management Education & Support    Presents for: Follow-up type 2 diabetes, overweight Body mass index is 27.43 kg/m .    Type of Service: In Person Visit      Assessment  4/21/2025 Initial education for new diagnosis of type 2 diabetes.  Since patient found out new diagnosis has already started implementing significant dietary changes and is very willing to continue with dietary and lifestyle modifications to improve glycemic control.  Patient will start blood glucose testing and follow-up in 6 weeks.  Patient has started taking 1 tablet 500 mg metformin XR and has no concerns.      6/5/2025  Follow up education.  Patient continues with dietary and lifestyle interventions.  Patient finds this is a new lifestyle that can be incorporated without any concerns.  Patient has been testing glucose as directed 30-day average 111 mg/dL.  Patient is taking metformin extended release 500 mg 1 tablet daily without any side effects.  No medication adjustments today.  Patient will follow-up in 4 months A1c at that time.    Patient's most recent   Lab Results   Component Value Date    A1C 8.5 04/11/2025     is not meeting goal of <7.0    Diabetes knowledge and skills assessment:   Patient is knowledgeable in diabetes management concepts related to: Monitoring and Taking Medication    Based on learning assessment above, most appropriate setting for further diabetes education would be: Individual setting.    Care Plan and Education Provided:  Healthy Eating: Eating out, Heart healthy diet, Label reading, and Weight Management,     Being Active: Amount recommended (150 minutes moderate or 75 minutes vigorous activity and 2-3 days strength training per week) and Relationship of activity to glucose,     Monitoring: Frequency of monitoring and Purpose,     Taking Medication: Action of prescribed medication(s) and When to take medication(s),     Problem Solving: When to call a health care provider,     Reducing Risks:  Complications of diabetes, Dental care, Eye care, Foot care, and Preventing cardiovascular disease, including blood pressure goals, lipid goals, recommendations for cardioprotective medications, statins, and aspirin, and     Healthy Coping: Benefits of making appropriate lifestyle changes    Patient verbalized understanding of diabetes self-management education concepts discussed, opportunities for ongoing education and support, and recommendations provided today.    Plan    Total Carbohydrates (in grams) = Breakfast  30    Lunch  30    Supper  30    If desired snacks 15                Blood Glucose testing max 180 at any time    (before and 2 hours after one meal) testing 1 or more meals/ week   Before eating goal 80 - 130mg/dL  2 hours after goal 80 - 150mg/dL     Topics to cover at upcoming visits: Any area as needed for ongoing diabetes self-management education and support/ motivational counseling.    See Care Plan for co-developed, patient-state behavior change goals.    Education Materials Provided:      Subjective/Objective  Prem is an 64 year old, presenting for the following diabetes education related to: Follow-up  Accompanied by: Self  Diabetes education in the past 24mo: Yes  Diabetes type: Type 2  Disease course: Stable  How confident are you filling out medical forms by yourself:: Extremely  Transportation concerns: No  Difficulty affording diabetes medication?: No  Difficulty affording diabetes testing supplies?: No  Other concerns: None  Cultural Influences/Ethnic Background:  Not  or     Diabetes Symptoms & Complications:  Diabetes Related Symptoms: None  Weight trend: Decreasing  Symptom course: Stable  Disease course: Stable  Complications assessed today?: Yes  Autonomic neuropathy: No  CVA: No  Heart disease: No  Nephropathy: No  Peripheral neuropathy: No  Peripheral Vascular Disease: No  Retinopathy: No  Sexual dysfunction: No    Patient Problem List and Family Medical History  "reviewed for relevant medical history, current medical status, and diabetes risk factors.    Vitals:  Ht 1.854 m (6' 1\")   Wt 94.3 kg (207 lb 14.4 oz)   BMI 27.43 kg/m    Estimated body mass index is 27.43 kg/m  as calculated from the following:    Height as of this encounter: 1.854 m (6' 1\").    Weight as of this encounter: 94.3 kg (207 lb 14.4 oz).   Last 3 BP:   BP Readings from Last 3 Encounters:   04/11/25 136/80   07/08/24 (!) 152/70   06/07/23 120/72       History   Smoking Status    Never   Smokeless Tobacco    Never       Labs:  Lab Results   Component Value Date    A1C 8.5 04/11/2025     Lab Results   Component Value Date     04/11/2025     Lab Results   Component Value Date     04/11/2025     Direct Measure HDL   Date Value Ref Range Status   04/11/2025 28 (L) >=40 mg/dL Final     GFR Estimate   Date Value Ref Range Status   04/11/2025 68 >60 mL/min/1.73m2 Final     Comment:     eGFR calculated using 2021 CKD-EPI equation.     No results found for: \"GFRESTBLACK\"  Lab Results   Component Value Date    CR 1.20 04/11/2025     Lab Results   Component Value Date    MICROL 15.3 04/11/2025    UMALCR 10.07 04/11/2025    UCRR 152.0 04/11/2025 6/5/2025   Healthy Eating   Healthy Eating Assessed Today Yes   Cultural/Restorationism diet restrictions? No   Do you have any food allergies or intolerances? No   Meal planning/habits --        Since new diagnosis of type 2 diabetes patient has been implementing significant changes to diet   Who cooks/prepares meals for you? Self;Spouse   Who purchases food in  your home? Self;Spouse   How many times a week on average do you eat food made away from home (restaurant/take-out)? 1   Meals include Dinner;Morning Snack;Afternoon Snack   Breakfast 2:30 am work water, banana, coffee, granola bar lower carb ~12 gm;  during work PB and jelly 1 sl bread, 1 hr later yogurt low carb   Lunch on way home 1 sl lower carb bread PB and Jelly, granola bar; snack later " almonds, ocean spray diet cachorro;   Dinner factor meal, diet green tea   Snacks now on 45 bread, SF jelly (less)   Other previously prior to dx was chocolate granola bar, regular juice was regular green tea, was more sweets   Beverages Water;Tea;Coffee;Milk       SF creamer and splenda   Has patient met with a dietitian in the past? 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  Moderate (like brisk walking)   Exercise Minutes per Week 280   Barrier to exercise None         6/5/2025   Monitoring   Monitoring Assessed Today Yes   Did patient bring glucose meter to appointment?  Yes   Blood Glucose Meter Accu-chek   Times checking blood sugar at home (number) 5+   Times checking blood sugar at home (per) Week   Blood glucose trend Decreasing       30-day average 111 mg/dL out of 37 readings: Range  mg/dL with one exception being 173 when he went out to eat and experimented to see his what his glucose reading would be.     Diabetes Medication(s)       Biguanides       metFORMIN (GLUCOPHAGE XR) 500 MG 24 hr tablet Take 1 tablet (500 mg) by mouth daily (with dinner).              6/5/2025   Taking Medications   Taking Medication Assessed Today Yes   Current Treatments Oral Medication (taken by mouth);Diet   Problems taking diabetes medications regularly? No   Diabetes medication side effects? No         6/5/2025   Problem Solving   Problem Solving Assessed Today Yes   Is the patient at risk for hypoglycemia? No   Is the patient at risk for DKA? No   Does patient have severe weather/disaster plan for diabetes management? Not Needed   Does patient have sick day plan for diabetes management? Not Needed           6/5/2025   Reducing Risks   Reducing Risks  Assessed Today Yes   Diabetes Risks Age over 45 years   CAD Risks Male sex   Has dilated eye exam at least once a year? No   Sees dentist every 6 months? Yes   Feet checked by healthcare provider in the last year? No         6/5/2025   Healthy Coping: Diabetes Distress Assessment   Healthy Coping Assessed Today Yes   I feel burned out by all of the attention and effort that diabetes demands of me. 1 - Not a Problem   It bothers me that diabetes seems to control my life. 1 - Not a Problem   I am frustrated that even when I do what I am supposed to for my diabetes, it doesn't seem to make a difference. 1 - Not a Problem   No matter how hard I try with my diabetes, it feels like it will never be good enough. 1 - Not a Problem   I am so tired of having to worry about diabetes all the time. 1 - Not a Problem   When it comes to my diabetes, I often feel like a failure. 1 - Not a Problem   It depresses me when I realize that my diabetes will likely never go away. 1 - Not a Problem   Living with diabetes is overwhelming for me. 1 - Not a Problem   T2 DDAS Total Score (0 - 1.9 Little or no DD, 2.0 - 2.9 Moderate DD,  3.0+ High DD) 1   Informal Support system: Family;Spouse       Bouchra Plummer RD, CD, [facility]     Time Spent: 60 minutes  Encounter Type: Individual    Any diabetes medication dose changes were made via the [facility] Standing Orders under the patient's referring provider.

## 2025-06-05 NOTE — LETTER
6/5/2025         RE: Edgardo David  [address] [id number]        Dear Colleague,    Thank you for referring your patient, Edgardo David, to the [facility]. Please see a copy of my visit note below.    Diabetes Self-Management Education & Support    Presents for: Follow-up type 2 diabetes, overweight Body mass index is 27.43 kg/m .    Type of Service: In Person Visit      Assessment  4/21/2025 Initial education for new diagnosis of type 2 diabetes.  Since patient found out new diagnosis has already started implementing significant dietary changes and is very willing to continue with dietary and lifestyle modifications to improve glycemic control.  Patient will start blood glucose testing and follow-up in 6 weeks.  Patient has started taking 1 tablet 500 mg metformin XR and has no concerns.      6/5/2025  Follow up education.  Patient continues with dietary and lifestyle interventions.  Patient finds this is a new lifestyle that can be incorporated without any concerns.  Patient has been testing glucose as directed 30-day average 111 mg/dL.  Patient is taking metformin extended release 500 mg 1 tablet daily without any side effects.  No medication adjustments today.  Patient will follow-up in 4 months A1c at that time.    Patient's most recent   Lab Results   Component Value Date    A1C 8.5 04/11/2025     is not meeting goal of <7.0    Diabetes knowledge and skills assessment:   Patient is knowledgeable in diabetes management concepts related to: Monitoring and Taking Medication    Based on learning assessment above, most appropriate setting for further diabetes education would be: Individual setting.    Care Plan and Education Provided:  Healthy Eating: Eating out, Heart healthy diet, Label reading, and Weight Management,     Being Active: Amount recommended (150 minutes moderate or 75 minutes vigorous activity and 2-3 days strength training per week) and  Relationship of activity to glucose,     Monitoring: Frequency of monitoring and Purpose,     Taking Medication: Action of prescribed medication(s) and When to take medication(s),     Problem Solving: When to call a health care provider,     Reducing Risks: Complications of diabetes, Dental care, Eye care, Foot care, and Preventing cardiovascular disease, including blood pressure goals, lipid goals, recommendations for cardioprotective medications, statins, and aspirin, and     Healthy Coping: Benefits of making appropriate lifestyle changes    Patient verbalized understanding of diabetes self-management education concepts discussed, opportunities for ongoing education and support, and recommendations provided today.    Plan    Total Carbohydrates (in grams) = Breakfast  30    Lunch  30    Supper  30    If desired snacks 15                Blood Glucose testing max 180 at any time    (before and 2 hours after one meal) testing 1 or more meals/ week   Before eating goal 80 - 130mg/dL  2 hours after goal 80 - 150mg/dL     Topics to cover at upcoming visits: Any area as needed for ongoing diabetes self-management education and support/ motivational counseling.    See Care Plan for co-developed, patient-state behavior change goals.    Education Materials Provided:      Subjective/Objective  Prem is an 64 year old, presenting for the following diabetes education related to: Follow-up  Accompanied by: Self  Diabetes education in the past 24mo: Yes  Diabetes type: Type 2  Disease course: Stable  How confident are you filling out medical forms by yourself:: Extremely  Transportation concerns: No  Difficulty affording diabetes medication?: No  Difficulty affording diabetes testing supplies?: No  Other concerns: None  Cultural Influences/Ethnic Background:  Not  or     Diabetes Symptoms & Complications:  Diabetes Related Symptoms: None  Weight trend: Decreasing  Symptom course: Stable  Disease course:  "Stable  Complications assessed today?: Yes  Autonomic neuropathy: No  CVA: No  Heart disease: No  Nephropathy: No  Peripheral neuropathy: No  Peripheral Vascular Disease: No  Retinopathy: No  Sexual dysfunction: No    Patient Problem List and Family Medical History reviewed for relevant medical history, current medical status, and diabetes risk factors.    Vitals:  Ht 1.854 m (6' 1\")   Wt 94.3 kg (207 lb 14.4 oz)   BMI 27.43 kg/m    Estimated body mass index is 27.43 kg/m  as calculated from the following:    Height as of this encounter: 1.854 m (6' 1\").    Weight as of this encounter: 94.3 kg (207 lb 14.4 oz).   Last 3 BP:   BP Readings from Last 3 Encounters:   04/11/25 136/80   07/08/24 (!) 152/70   06/07/23 120/72       History   Smoking Status     Never   Smokeless Tobacco     Never       Labs:  Lab Results   Component Value Date    A1C 8.5 04/11/2025     Lab Results   Component Value Date     04/11/2025     Lab Results   Component Value Date     04/11/2025     Direct Measure HDL   Date Value Ref Range Status   04/11/2025 28 (L) >=40 mg/dL Final     GFR Estimate   Date Value Ref Range Status   04/11/2025 68 >60 mL/min/1.73m2 Final     Comment:     eGFR calculated using 2021 CKD-EPI equation.     No results found for: \"GFRESTBLACK\"  Lab Results   Component Value Date    CR 1.20 04/11/2025     Lab Results   Component Value Date    MICROL 15.3 04/11/2025    UMALCR 10.07 04/11/2025    UCRR 152.0 04/11/2025 6/5/2025   Healthy Eating   Healthy Eating Assessed Today Yes   Cultural/Anglican diet restrictions? No   Do you have any food allergies or intolerances? No   Meal planning/habits --        Since new diagnosis of type 2 diabetes patient has been implementing significant changes to diet   Who cooks/prepares meals for you? Self;Spouse   Who purchases food in  your home? Self;Spouse   How many times a week on average do you eat food made away from home (restaurant/take-out)? 1   Meals " include Dinner;Morning Snack;Afternoon Snack   Breakfast 2:30 am work water, banana, coffee, granola bar lower carb ~12 gm;  during work PB and jelly 1 sl bread, 1 hr later yogurt low carb   Lunch on way home 1 sl lower carb bread PB and Jelly, granola bar; snack later almonds, ocean spray diet c[place];   Dinner factor meal, diet green tea   Snacks now on 45 bread, SF jelly (less)   Other previously prior to dx was chocolate granola bar, regular juice was regular green tea, was more sweets   Beverages Water;Tea;Coffee;Milk       SF creamer and splenda   Has patient met with a dietitian in the past? 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  Moderate (like brisk walking)   Exercise Minutes per Week 280   Barrier to exercise None         6/5/2025   Monitoring   Monitoring Assessed Today Yes   Did patient bring glucose meter to appointment?  Yes   Blood Glucose Meter Accu-chek   Times checking blood sugar at home (number) 5+   Times checking blood sugar at home (per) Week   Blood glucose trend Decreasing       30-day average 111 mg/dL out of 37 readings: Range  mg/dL with one exception being 173 when he went out to eat and experimented to see his what his glucose reading would be.     Diabetes Medication(s)       Biguanides       metFORMIN (GLUCOPHAGE XR) 500 MG 24 hr tablet Take 1 tablet (500 mg) by mouth daily (with dinner).              6/5/2025   Taking Medications   Taking Medication Assessed Today Yes   Current Treatments Oral Medication (taken by mouth);Diet   Problems taking diabetes medications regularly? No   Diabetes medication side effects? No         6/5/2025   Problem Solving   Problem Solving Assessed Today Yes   Is the patient at  risk for hypoglycemia? No   Is the patient at risk for DKA? No   Does patient have severe weather/disaster plan for diabetes management? Not Needed   Does patient have sick day plan for diabetes management? Not Needed           6/5/2025   Reducing Risks   Reducing Risks Assessed Today Yes   Diabetes Risks Age over 45 years   CAD Risks Male sex   Has dilated eye exam at least once a year? No   Sees dentist every 6 months? Yes   Feet checked by healthcare provider in the last year? No         6/5/2025   Healthy Coping: Diabetes Distress Assessment   Healthy Coping Assessed Today Yes   I feel burned out by all of the attention and effort that diabetes demands of me. 1 - Not a Problem   It bothers me that diabetes seems to control my life. 1 - Not a Problem   I am frustrated that even when I do what I am supposed to for my diabetes, it doesn't seem to make a difference. 1 - Not a Problem   No matter how hard I try with my diabetes, it feels like it will never be good enough. 1 - Not a Problem   I am so tired of having to worry about diabetes all the time. 1 - Not a Problem   When it comes to my diabetes, I often feel like a failure. 1 - Not a Problem   It depresses me when I realize that my diabetes will likely never go away. 1 - Not a Problem   Living with diabetes is overwhelming for me. 1 - Not a Problem   T2 DDAS Total Score (0 - 1.9 Little or no DD, 2.0 - 2.9 Moderate DD,  3.0+ High DD) 1   Informal Support system: Family;Spouse       Bouchra Plummer RD, CD, [facility]     Time Spent: 60 minutes  Encounter Type: Individual    Any diabetes medication dose changes were made via the [facility] Standing Orders under the patient's referring provider.

## 2025-07-20 ENCOUNTER — HEALTH MAINTENANCE LETTER (OUTPATIENT)
Age: 65
End: 2025-07-20